# Patient Record
Sex: MALE | Race: OTHER
[De-identification: names, ages, dates, MRNs, and addresses within clinical notes are randomized per-mention and may not be internally consistent; named-entity substitution may affect disease eponyms.]

---

## 2019-02-20 ENCOUNTER — HOSPITAL ENCOUNTER (EMERGENCY)
Dept: HOSPITAL 72 - EMR | Age: 29
Discharge: HOME | End: 2019-02-20
Payer: MEDICAID

## 2019-02-20 VITALS — SYSTOLIC BLOOD PRESSURE: 126 MMHG | DIASTOLIC BLOOD PRESSURE: 72 MMHG

## 2019-02-20 VITALS — WEIGHT: 150 LBS | HEIGHT: 70 IN | BODY MASS INDEX: 21.47 KG/M2

## 2019-02-20 VITALS — SYSTOLIC BLOOD PRESSURE: 128 MMHG | DIASTOLIC BLOOD PRESSURE: 70 MMHG

## 2019-02-20 DIAGNOSIS — Z23: ICD-10-CM
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
## 2019-02-20 DIAGNOSIS — F20.9: ICD-10-CM

## 2019-02-20 DIAGNOSIS — M79.661: ICD-10-CM

## 2019-02-20 DIAGNOSIS — M79.662: ICD-10-CM

## 2019-02-20 DIAGNOSIS — S50.811A: Primary | ICD-10-CM
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
## 2019-02-20 PROCEDURE — 99284 EMERGENCY DEPT VISIT MOD MDM: CPT

## 2019-02-20 PROCEDURE — 90471 IMMUNIZATION ADMIN: CPT

## 2019-02-20 PROCEDURE — 70450 CT HEAD/BRAIN W/O DYE: CPT

## 2019-02-20 PROCEDURE — 90715 TDAP VACCINE 7 YRS/> IM: CPT
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
